# Patient Record
Sex: MALE | Race: WHITE | NOT HISPANIC OR LATINO | Employment: FULL TIME | URBAN - NONMETROPOLITAN AREA
[De-identification: names, ages, dates, MRNs, and addresses within clinical notes are randomized per-mention and may not be internally consistent; named-entity substitution may affect disease eponyms.]

---

## 2017-01-26 ENCOUNTER — OFFICE VISIT (OUTPATIENT)
Dept: FAMILY MEDICINE CLINIC | Facility: CLINIC | Age: 32
End: 2017-01-26

## 2017-01-26 VITALS
HEART RATE: 81 BPM | OXYGEN SATURATION: 97 % | DIASTOLIC BLOOD PRESSURE: 85 MMHG | SYSTOLIC BLOOD PRESSURE: 125 MMHG | WEIGHT: 210.2 LBS | HEIGHT: 70 IN | BODY MASS INDEX: 30.09 KG/M2

## 2017-01-26 DIAGNOSIS — M54.42 CHRONIC BILATERAL LOW BACK PAIN WITH LEFT-SIDED SCIATICA: Primary | ICD-10-CM

## 2017-01-26 DIAGNOSIS — G89.29 CHRONIC BILATERAL LOW BACK PAIN WITH LEFT-SIDED SCIATICA: Primary | ICD-10-CM

## 2017-01-26 DIAGNOSIS — F17.210 CIGARETTE NICOTINE DEPENDENCE WITHOUT COMPLICATION: ICD-10-CM

## 2017-01-26 PROCEDURE — 99213 OFFICE O/P EST LOW 20 MIN: CPT | Performed by: FAMILY MEDICINE

## 2017-01-26 RX ORDER — BUPRENORPHINE HYDROCHLORIDE, NALOXONE HYDROCHLORIDE 8; 2 MG/1; MG/1
FILM, SOLUBLE BUCCAL; SUBLINGUAL
Refills: 0 | COMMUNITY
Start: 2017-01-17

## 2017-01-26 RX ORDER — GABAPENTIN 400 MG/1
800 CAPSULE ORAL 3 TIMES DAILY
Qty: 180 CAPSULE | Refills: 6 | Status: SHIPPED | OUTPATIENT
Start: 2017-01-26

## 2017-01-26 NOTE — MR AVS SNAPSHOT
Albert Hodge   1/26/2017 1:45 PM   Office Visit    Dept Phone:  [phone]   Encounter #:  [id number]    Provider:  Ludwig Walker MD   Department:  [facility]                Your Full Care Plan              Today's Medication Changes          These changes are accurate as of: 1/26/17  2:24 PM.  If you have any questions, ask your nurse or doctor.               Medication(s)that have changed:     * gabapentin 600 MG tablet   Commonly known as:  NEURONTIN   Take 1 tablet by mouth 3 (Three) Times a Day.   What changed:  Another medication with the same name was added. Make sure you understand how and when to take each.   Changed by:  Ludwig Walker MD       * gabapentin 400 MG capsule   Commonly known as:  NEURONTIN   Take 2 capsules by mouth 3 (Three) Times a Day.   What changed:  You were already taking a medication with the same name, and this prescription was added. Make sure you understand how and when to take each.   Changed by:  Ludwig Walker MD       * Notice:  This list has 2 medication(s) that are the same as other medications prescribed for you. Read the directions carefully, and ask your doctor or other care provider to review them with you.         Where to Get Your Medications      These medications were sent to [facility]/pharmacy #[id number] - [place], [place] - [address] [place] [address] [phone]  - [phone] [address] [address]     Phone:  [phone]     gabapentin 400 MG capsule                  Your Updated Medication List          This list is accurate as of: 1/26/17  2:24 PM.  Always use your most recent med list.                buPROPion  MG 12 hr tablet   Commonly known as:  WELLBUTRIN SR   Take 1 tablet by mouth 2 (Two) Times a Day.       doxycycline 50 MG capsule   Commonly known as:  MONODOX   Take 2 capsules by mouth 2 (Two) Times a Day.       * gabapentin 600 MG tablet   Commonly known as:  NEURONTIN   Take  "1 tablet by mouth 3 (Three) Times a Day.       * gabapentin 400 MG capsule   Commonly known as:  NEURONTIN   Take 2 capsules by mouth 3 (Three) Times a Day.       SUBOXONE 8-2 MG film film   Generic drug:  buprenorphine-naloxone       * Notice:  This list has 2 medication(s) that are the same as other medications prescribed for you. Read the directions carefully, and ask your doctor or other care provider to review them with you.            You Were Diagnosed With        Codes Comments    Chronic bilateral low back pain with left-sided sciatica    -  Primary ICD-10-CM: M54.42, G89.29  ICD-9-CM: 724.2, 724.3, 338.29     Cigarette nicotine dependence without complication     ICD-10-CM: F17.210  ICD-9-CM: 305.1       Instructions     None    Patient Instructions History      Upcoming Appointments     Visit Type Date Time Department    OFFICE VISIT 1/26/2017  1:45 PM [facility] RESIDENT [facility]      [facility]     Our records indicate that you have declined [email or web address] signup. If you would like to sign up for [employer], please email [email or web address] or call [phone] to obtain an activation code.             Other Info from Your Visit           Other Notes About Your Plan     Risk score: 3  Red Team        Allergies     No Known Allergies      Reason for Visit     Follow-up     Back Pain           Vital Signs     Blood Pressure Pulse Height Weight Oxygen Saturation Body Mass Index    125/85 (BP Location: Right arm, Patient Position: Sitting) 81 70\" (177.8 cm) 210 lb 3.2 oz (95.3 kg) 97% 30.16 kg/m2    Smoking Status                   Current Every Day Smoker           Problems and Diagnoses Noted     Chronic bilateral low back pain with left-sided sciatica    -  Primary    Nicotine dependence            "

## 2017-01-26 NOTE — PROGRESS NOTES
Subjective:     Albert Hodge is a 31 y.o. male who presents for follow up for lower back pain and nicotine dependence. Patient states that since starting his Wellbutrin he has been able to cut down his smoking from 1 ppd to about 0.5 ppd but has not been taking it recently. Patient has still been suffering from his left lumbar back pain with radiation down his left leg down to his left ankle but it has improved with the increase in gabapentin since his last visit. He is still needing to schedule PT for his back pain. Patient has no other complaints or concerns today.     Preventative:  Over the past 2 weeks, have you felt down, depressed, or hopeless?No   Over the past 2 weeks, have you felt little interest or pleasure in doing things?No  Clinical depression screening refused by patient.No     On osteoporosis therapy?Not Indicated     Past Medical Hx:  No past medical history on file.    Past Surgical Hx:  Past Surgical History   Procedure Laterality Date   • Appendectomy         Health Maintenance:  Health Maintenance   Topic Date Due   • TDAP/TD VACCINES (2 - Td) 11/29/2026   • PNEUMOCOCCAL VACCINE (19-64 MEDIUM RISK)  Addressed   • INFLUENZA VACCINE  Addressed       Current Meds:    Current Outpatient Prescriptions:   •  buPROPion SR (WELLBUTRIN SR) 150 MG 12 hr tablet, Take 1 tablet by mouth 2 (Two) Times a Day., Disp: 60 tablet, Rfl: 2  •  doxycycline (MONODOX) 50 MG capsule, Take 2 capsules by mouth 2 (Two) Times a Day., Disp: 40 capsule, Rfl: 0  •  gabapentin (NEURONTIN) 600 MG tablet, Take 1 tablet by mouth 3 (Three) Times a Day., Disp: 90 tablet, Rfl: 6  •  SUBOXONE 8-2 MG film film, , Disp: , Rfl: 0    Allergies:  Review of patient's allergies indicates no known allergies.    Family Hx:  No family history on file.     Social History:  Social History     Social History   • Marital status: Single     Spouse name: N/A   • Number of children: N/A   • Years of education: N/A     Occupational History   •  "Not on file.     Social History Main Topics   • Smoking status: Current Every Day Smoker   • Smokeless tobacco: Not on file   • Alcohol use No   • Drug use: Not on file   • Sexual activity: Not on file     Other Topics Concern   • Not on file     Social History Narrative       Review of Systems  General:negative for - chills, fatigue, fever, hot flashes, malaise, night sweats, weight gain or weight loss  Psychological: negative for - anxiety, depression, sleep disturbances or suicidal ideation  Ophthalmic: negative for - blurry vision or loss of vision  ENT: negative for - hearing change, nasal congestion or sore throat  Hematological and Lymphatic: negative for - jaundice  Endocrine: negative for - hair pattern changes, skin changes.  Respiratory: no cough, shortness of breath, or wheezing  Cardiovascular: no chest pain, edema or dyspnea on exertion  Gastrointestinal: no  Nausea/vomiting, abdominal pain, change in bowel habits, or black or bloody stools  Genito-Urinary: no trouble voiding, dysuria, or hematuria.  Musculoskeletal: negative for - joint pain or muscle pain. Positive for left sided lumbar pain with radiation down left lower extremity, lumbar pain elicited by laying down, inactivity and prolonged walking.  Neurological: negative for - dizziness, headaches, numbness/tingling or seizures  Dermatological: negative for rash and skin lesion changes      Objective:     Visit Vitals   • /85 (BP Location: Right arm, Patient Position: Sitting)   • Pulse 81   • Ht 70\" (177.8 cm)   • Wt 210 lb 3.2 oz (95.3 kg)   • SpO2 97%   • BMI 30.16 kg/m2           General Appearance:    Alert, cooperative, no distress, appears stated age   Head:    Normocephalic, without obvious abnormality, atraumatic   Eyes:    PERRL, conjunctiva/corneas clear, EOM's intact   Ears:    Normal external ear canals, both ears   Nose:   Nares normal, septum midline, mucosa normal, no drainage     or sinus tenderness   Throat:   Lips, " mucosa, and tongue normal; teeth and gums normal   Neck:   Supple, symmetrical, trachea midline, no adenopathy   Back:     Symmetric, no curvature, ROM normal, no CVA tenderness. Pain elicited with laying down on bed.   Lungs:     Clear to auscultation bilaterally, respirations unlabored   Chest Wall:    No tenderness or deformity    Heart:    Regular rate and rhythm, S1 and S2 normal, no murmur, rub    or gallop   Abdomen:     Soft, non-tender, bowel sounds active all four quadrants,     no masses, no organomegaly   Extremities:   Extremities normal, atraumatic, no cyanosis or edema   Pulses:   2+ and symmetric all extremities   Skin:   Skin color, texture, turgor normal, no rashes or lesions   Neurologic:   CNII-XII grossly intact                Assessment/Plan:     1. Chronic bilateral low back pain with left-sided sciatica    2. Cigarette nicotine dependence without complication         Follow-up:     Return in about 6 months (around 7/26/2017) for Next scheduled follow up.    1. Lower back pain- Gabapentin increased to 800 mg TID    -Referral to physical therapy made, patient to make appointment  2. Nicotine dependence- Placed on a 12 week course of Wellbutrin for smoking cessation, has decreased smoking from 1 PPD to about 0.5 ppd  3. Immunizations- patient refused Flu vaccine, TdaP, and pneumococcal vaccinations today.    GOALS:  Quit smoking, improve back pain.    Preventative:  Smoking cessation counseling was provided.  Does not drink.  BMI WNL    RISK SCORE: 3        This document has been electronically signed by Ludwig Bentley MD on January 26, 2017 2:15 PM

## 2017-03-13 ENCOUNTER — TRANSCRIBE ORDERS (OUTPATIENT)
Dept: PHYSICAL THERAPY | Facility: HOSPITAL | Age: 32
End: 2017-03-13

## 2017-03-13 DIAGNOSIS — M54.40 BACK PAIN OF LUMBAR REGION WITH SCIATICA: Primary | ICD-10-CM

## 2018-09-10 ENCOUNTER — HOSPITAL ENCOUNTER (EMERGENCY)
Facility: HOSPITAL | Age: 33
Discharge: HOME | End: 2018-09-11
Attending: EMERGENCY MEDICINE | Admitting: EMERGENCY MEDICINE

## 2018-09-10 DIAGNOSIS — L02.91 ABSCESS: Primary | ICD-10-CM

## 2018-09-10 PROCEDURE — 99283 EMERGENCY DEPT VISIT LOW MDM: CPT

## 2018-09-10 RX ORDER — IBUPROFEN 800 MG/1
800 TABLET ORAL EVERY 6 HOURS PRN
Qty: 20 TABLET | Refills: 0 | Status: SHIPPED | OUTPATIENT
Start: 2018-09-10

## 2018-09-10 RX ORDER — CEPHALEXIN 500 MG/1
500 CAPSULE ORAL ONCE
Status: COMPLETED | OUTPATIENT
Start: 2018-09-10 | End: 2018-09-11

## 2018-09-10 RX ORDER — LIDOCAINE HYDROCHLORIDE 20 MG/ML
10 INJECTION, SOLUTION INFILTRATION; PERINEURAL ONCE
Status: COMPLETED | OUTPATIENT
Start: 2018-09-10 | End: 2018-09-10

## 2018-09-10 RX ORDER — OXYCODONE HYDROCHLORIDE AND ACETAMINOPHEN 5; 325 MG/1; MG/1
2 TABLET ORAL ONCE
Status: COMPLETED | OUTPATIENT
Start: 2018-09-10 | End: 2018-09-11

## 2018-09-10 RX ORDER — CEPHALEXIN 500 MG/1
500 CAPSULE ORAL 3 TIMES DAILY
Qty: 15 CAPSULE | Refills: 0 | Status: SHIPPED | OUTPATIENT
Start: 2018-09-10 | End: 2018-09-14 | Stop reason: SDUPTHER

## 2018-09-10 RX ADMIN — LIDOCAINE HYDROCHLORIDE 10 ML: 20 INJECTION, SOLUTION INFILTRATION; PERINEURAL at 22:26

## 2018-09-10 NOTE — ED TRIAGE NOTES
Patient provided with warm compress to place on abscess located on the inside of his right arm.

## 2018-09-11 ENCOUNTER — TELEPHONE (OUTPATIENT)
Dept: FAMILY MEDICINE CLINIC | Facility: CLINIC | Age: 33
End: 2018-09-11

## 2018-09-11 VITALS
SYSTOLIC BLOOD PRESSURE: 123 MMHG | TEMPERATURE: 97.6 F | WEIGHT: 200 LBS | RESPIRATION RATE: 18 BRPM | HEIGHT: 70 IN | OXYGEN SATURATION: 96 % | BODY MASS INDEX: 28.63 KG/M2 | DIASTOLIC BLOOD PRESSURE: 71 MMHG | HEART RATE: 62 BPM

## 2018-09-11 RX ADMIN — OXYCODONE HYDROCHLORIDE AND ACETAMINOPHEN 2 TABLET: 5; 325 TABLET ORAL at 00:02

## 2018-09-11 RX ADMIN — CEPHALEXIN 500 MG: 500 CAPSULE ORAL at 00:02

## 2018-09-11 NOTE — ED PROVIDER NOTES
Subjective   32-year-old male presents the emergency department with an abscess to his right elbow.  He started having pain in the area on the medial upper arm just proximal to the elbow several days ago.  He's been placing warm compresses on it and it seems to be worsening.  He said no fevers or chills.  He has had no drainage in that area.  The patient denies any IV drug use.  Nothing makes symptoms better or worse.        Abscess   Location:  Shoulder/arm  Shoulder/arm abscess location:  R elbow  Abscess quality: painful, redness and warmth    Duration:  4 days  Progression:  Worsening  Pain details:     Quality:  Aching    Severity:  Severe    Timing:  Constant    Progression:  Worsening  Chronicity:  New  Context: not injected drug use and not skin injury    Relieved by:  Nothing  Worsened by:  Nothing  Associated symptoms: no fatigue, no fever, no nausea and no vomiting        Review of Systems   Constitutional: Negative for chills, fatigue and fever.   HENT: Negative for congestion and sore throat.    Eyes: Negative for visual disturbance.   Respiratory: Negative for cough and shortness of breath.    Cardiovascular: Negative for chest pain and leg swelling.   Gastrointestinal: Negative for abdominal pain, nausea and vomiting.   Genitourinary: Negative for dysuria.   Musculoskeletal: Negative for back pain.   Skin: Negative for rash.   Neurological: Negative for dizziness and weakness.   Psychiatric/Behavioral: Negative for behavioral problems.   All other systems reviewed and are negative.      History reviewed. No pertinent past medical history.    No Known Allergies    Past Surgical History:   Procedure Laterality Date   • APPENDECTOMY         History reviewed. No pertinent family history.    Social History     Social History   • Marital status: Single     Social History Main Topics   • Smoking status: Current Every Day Smoker     Packs/day: 1.00     Years: 8.00     Types: Cigarettes   • Smokeless tobacco:  Never Used   • Alcohol use No   • Drug use: No   • Sexual activity: Defer     Other Topics Concern   • Not on file           Objective   Physical Exam   Constitutional: He is oriented to person, place, and time. He appears well-developed and well-nourished.   HENT:   Head: Normocephalic and atraumatic.   Eyes: Pupils are equal, round, and reactive to light. EOM are normal.   Neck: Normal range of motion. Neck supple.   No midline tenderness   Cardiovascular: Normal rate, regular rhythm, normal heart sounds and intact distal pulses.  Exam reveals no gallop and no friction rub.    No murmur heard.  Pulmonary/Chest: Breath sounds normal. No respiratory distress. He has no wheezes. He has no rales.   No rhonchi   Abdominal: Soft. Bowel sounds are normal. He exhibits no distension. There is no tenderness.   Musculoskeletal: Normal range of motion. He exhibits no tenderness or deformity.   Neurological: He is alert and oriented to person, place, and time. No cranial nerve deficit. He exhibits normal muscle tone. Coordination normal.   Skin: Skin is warm and dry. No rash noted.   There is a 2 cm tender indurated but not fluctuant nodule on the medial aspect of the right elbow just proximal to the medial epicondyle.    Psychiatric: He has a normal mood and affect. His behavior is normal.   Nursing note and vitals reviewed.      Incision & Drainage  Date/Time: 9/10/2018 11:26 PM  Performed by: GEOVANNY RAMIREZ  Authorized by: GEOVANNY RAMIREZ     Consent:     Consent obtained:  Verbal    Consent given by:  Patient    Risks discussed:  Bleeding, damage to other organs and infection    Alternatives discussed:  No treatment  Location:     Type:  Abscess    Location:  Upper extremity    Upper extremity location:  Elbow    Elbow location:  R elbow  Pre-procedure details:     Skin preparation:  Betadine  Procedure details:     Needle aspiration: no      Incision types:  Stab incision    Incision depth:  Subcutaneous    Scalpel  blade:  11    Wound management:  Probed and deloculated    Drainage:  Bloody    Drainage amount:  Scant    Wound treatment:  Wound left open    Packing materials:  None  Post-procedure details:     Patient tolerance of procedure:  Tolerated well, no immediate complications                 ED Course                  MDM  Number of Diagnoses or Management Options  Abscess:   Diagnosis management comments: 11:37 PM small upper extremity abscess with out abscess cavity at this time.  Patient was given antibiotics here.  Patient also states he is no longer taking Suboxone and would like to have pain medication.        Final diagnoses:   Abscess            Alex Lloyd MD  09/10/18 [id number]

## 2018-09-11 NOTE — ED NOTES
Attempted to take pt to room, pt not in lobby at this time.      Kylah Haywood, RN  09/10/18 1953

## 2018-09-14 ENCOUNTER — HOSPITAL ENCOUNTER (EMERGENCY)
Facility: HOSPITAL | Age: 33
Discharge: HOME | End: 2018-09-14
Attending: FAMILY MEDICINE | Admitting: FAMILY MEDICINE

## 2018-09-14 ENCOUNTER — OFFICE VISIT (OUTPATIENT)
Dept: FAMILY MEDICINE CLINIC | Facility: CLINIC | Age: 33
End: 2018-09-14

## 2018-09-14 VITALS
SYSTOLIC BLOOD PRESSURE: 124 MMHG | HEIGHT: 70 IN | WEIGHT: 198.8 LBS | BODY MASS INDEX: 28.46 KG/M2 | OXYGEN SATURATION: 98 % | TEMPERATURE: 98.8 F | HEART RATE: 80 BPM | DIASTOLIC BLOOD PRESSURE: 78 MMHG

## 2018-09-14 VITALS
HEART RATE: 73 BPM | SYSTOLIC BLOOD PRESSURE: 122 MMHG | WEIGHT: 200 LBS | DIASTOLIC BLOOD PRESSURE: 72 MMHG | OXYGEN SATURATION: 97 % | TEMPERATURE: 97.2 F | RESPIRATION RATE: 18 BRPM | BODY MASS INDEX: 28.63 KG/M2 | HEIGHT: 70 IN

## 2018-09-14 DIAGNOSIS — L02.91 ABSCESS: Primary | ICD-10-CM

## 2018-09-14 DIAGNOSIS — Z71.6 ENCOUNTER FOR TOBACCO USE CESSATION COUNSELING: ICD-10-CM

## 2018-09-14 PROCEDURE — 99283 EMERGENCY DEPT VISIT LOW MDM: CPT

## 2018-09-14 PROCEDURE — 99203 OFFICE O/P NEW LOW 30 MIN: CPT | Performed by: STUDENT IN AN ORGANIZED HEALTH CARE EDUCATION/TRAINING PROGRAM

## 2018-09-14 RX ORDER — CEPHALEXIN 500 MG/1
500 CAPSULE ORAL 4 TIMES DAILY
Qty: 40 CAPSULE | Refills: 0 | Status: SHIPPED | OUTPATIENT
Start: 2018-09-14

## 2018-09-14 RX ORDER — IBUPROFEN 800 MG/1
800 TABLET ORAL EVERY 8 HOURS PRN
Qty: 30 TABLET | Refills: 0 | Status: SHIPPED | OUTPATIENT
Start: 2018-09-14

## 2018-09-14 RX ORDER — CEPHALEXIN 500 MG/1
500 CAPSULE ORAL 3 TIMES DAILY
Qty: 15 CAPSULE | Refills: 0 | Status: SHIPPED | OUTPATIENT
Start: 2018-09-14 | End: 2018-09-14

## 2018-09-14 NOTE — ED PROVIDER NOTES
Subjective   Abscess right arm, pt seen in ED earlier this week in the emergency department with I&D and discharged home on Keflex.  Patient states that his refrigerator had a leak, and his kitchen flooded, and his prescription got wet and ruined.        Abscess   Location:  Shoulder/arm  Shoulder/arm abscess location:  R arm  Abscess quality: induration and painful    Abscess quality: not draining, no fluctuance, no itching, no redness, no warmth and not weeping    Duration:  6 days  Progression:  Improving  Pain details:     Quality:  Aching    Severity:  Mild    Duration:  6 days    Progression:  Waxing and waning  Chronicity:  New  Worsened by:  Nothing  Associated symptoms: no fatigue, no fever, no headaches, no nausea and no vomiting        Review of Systems   Constitutional: Negative for appetite change, chills, diaphoresis, fatigue and fever.   HENT: Negative for congestion, ear discharge, ear pain, nosebleeds, rhinorrhea, sinus pressure, sore throat and trouble swallowing.    Eyes: Negative for discharge and redness.   Respiratory: Negative for apnea, cough, chest tightness, shortness of breath and wheezing.    Cardiovascular: Negative for chest pain.   Gastrointestinal: Negative for abdominal pain, diarrhea, nausea and vomiting.   Endocrine: Negative for polyuria.   Genitourinary: Negative for dysuria, frequency and urgency.   Musculoskeletal: Negative for myalgias and neck pain.   Skin: Negative for color change and rash.   Allergic/Immunologic: Negative for immunocompromised state.   Neurological: Negative for dizziness, seizures, syncope, weakness, light-headedness and headaches.   Hematological: Negative for adenopathy. Does not bruise/bleed easily.   Psychiatric/Behavioral: Negative for behavioral problems and confusion.   All other systems reviewed and are negative.      History reviewed. No pertinent past medical history.    No Known Allergies    Past Surgical History:   Procedure Laterality Date    • APPENDECTOMY         History reviewed. No pertinent family history.    Social History     Social History   • Marital status: Single     Social History Main Topics   • Smoking status: Current Every Day Smoker     Packs/day: 1.00     Years: 8.00     Types: Cigarettes   • Smokeless tobacco: Never Used   • Alcohol use No   • Drug use: No   • Sexual activity: Defer     Other Topics Concern   • Not on file           Objective   Physical Exam   Constitutional: He is oriented to person, place, and time. He appears well-developed and well-nourished.   HENT:   Head: Normocephalic and atraumatic.   Nose: Nose normal.   Mouth/Throat: Oropharynx is clear and moist.   Eyes: Pupils are equal, round, and reactive to light. Conjunctivae and EOM are normal. Right eye exhibits no discharge. Left eye exhibits no discharge. No scleral icterus.   Neck: Normal range of motion. Neck supple. No tracheal deviation present.   Cardiovascular: Normal rate, regular rhythm and normal heart sounds.    No murmur heard.  Pulmonary/Chest: Effort normal and breath sounds normal. No stridor. No respiratory distress. He has no wheezes. He has no rales.   Abdominal: Soft. Bowel sounds are normal. He exhibits no distension and no mass. There is no tenderness. There is no rebound and no guarding.   Musculoskeletal: He exhibits no edema.        Right shoulder: He exhibits tenderness.        Arms:  2 cm area of painful induration over the I & D site.  No erythema, no purulence, no drainage.   Neurological: He is alert and oriented to person, place, and time. Coordination normal.   Skin: Skin is warm and dry. No rash noted. No erythema.   Psychiatric: He has a normal mood and affect. His behavior is normal. Thought content normal.   Nursing note and vitals reviewed.      Procedures           ED Course                  MDM      Final diagnoses:   Abscess            Jermaine Dueñas MD  09/14/18 1219

## 2018-09-14 NOTE — PROGRESS NOTES
ID: Albert Hodge    CC:   Chief Complaint   Patient presents with   • Abscess     er follow up       Subjective:     Albert Hodge is a 32 y.o. male who presents for arm pain.    3 days ago patient went to the ER and had an IND of an abscess on his right forearm near the elbow.  He was given a prescription of antibiotics to go home on, but never got it filled.  He also says that they packed it open, but he never has changed the dressing.  Patient has had subjective fevers and chills at home.  He has been nauseated, and not really eating anything.  Patient has excruciating right arm pain and is barely able to move his right upper extremity due to pain.         Preventative:  Over the past 2 weeks, have you felt down, depressed, or hopeless?No   Over the past 2 weeks, have you felt little interest or pleasure in doing things?No  Clinical depression screening refused by patient.No     On osteoporosis therapy?No     Past Medical Hx:  History reviewed. No pertinent past medical history.    Past Surgical Hx:  Past Surgical History:   Procedure Laterality Date   • APPENDECTOMY         Health Maintenance:  Health Maintenance   Topic Date Due   • ANNUAL PHYSICAL  12/30/1987   • INFLUENZA VACCINE  08/01/2018   • TDAP/TD VACCINES (2 - Td) 11/29/2026   • PNEUMOCOCCAL VACCINE (19-64 MEDIUM RISK)  Addressed       Current Meds:    Current Outpatient Prescriptions:   •  buPROPion SR (WELLBUTRIN SR) 150 MG 12 hr tablet, Take 1 tablet by mouth 2 (Two) Times a Day., Disp: 60 tablet, Rfl: 2  •  cephalexin (KEFLEX) 500 MG capsule, Take 1 capsule by mouth 3 (Three) Times a Day., Disp: 15 capsule, Rfl: 0  •  doxycycline (MONODOX) 50 MG capsule, Take 2 capsules by mouth 2 (Two) Times a Day., Disp: 40 capsule, Rfl: 0  •  gabapentin (NEURONTIN) 400 MG capsule, Take 2 capsules by mouth 3 (Three) Times a Day., Disp: 180 capsule, Rfl: 6  •  gabapentin (NEURONTIN) 600 MG tablet, Take 1 tablet by mouth 3 (Three) Times a Day.,  Disp: 90 tablet, Rfl: 6  •  ibuprofen (ADVIL,MOTRIN) 800 MG tablet, Take 1 tablet by mouth Every 6 (Six) Hours As Needed for Mild Pain ., Disp: 20 tablet, Rfl: 0  •  SUBOXONE 8-2 MG film film, , Disp: , Rfl: 0    Allergies:  Patient has no known allergies.    Family Hx:  History reviewed. No pertinent family history.     Social History:  Social History     Social History   • Marital status: Single     Spouse name: N/A   • Number of children: N/A   • Years of education: N/A     Occupational History   • Not on file.     Social History Main Topics   • Smoking status: Current Every Day Smoker     Packs/day: 1.00     Years: 8.00     Types: Cigarettes   • Smokeless tobacco: Never Used   • Alcohol use No   • Drug use: No   • Sexual activity: Defer     Other Topics Concern   • Not on file     Social History Narrative   • No narrative on file       Review of Systems   Constitutional: Positive for appetite change, chills and fever. Negative for activity change, diaphoresis and fatigue.   HENT: Negative for congestion, ear pain, rhinorrhea, sneezing and sore throat.    Eyes: Negative for pain, redness, itching and visual disturbance.   Respiratory: Negative for cough, choking, chest tightness, shortness of breath, wheezing and stridor.    Cardiovascular: Negative for chest pain, palpitations and leg swelling.   Gastrointestinal: Positive for nausea. Negative for abdominal distention, abdominal pain, blood in stool, constipation, diarrhea, rectal pain and vomiting.   Genitourinary: Negative for difficulty urinating, dysuria, flank pain and frequency.   Musculoskeletal:        Arm pain   Skin: Positive for wound. Negative for color change and rash.   Neurological: Negative for dizziness, seizures, syncope, weakness, light-headedness, numbness and headaches.   Psychiatric/Behavioral: Negative for agitation, confusion, decreased concentration and sleep disturbance. The patient is not nervous/anxious.    All other systems reviewed  "and are negative.          Objective:     /78 (BP Location: Left arm, Patient Position: Sitting, Cuff Size: Large Adult)   Pulse 80   Temp 98.8 °F (37.1 °C) (Tympanic)   Ht 177.8 cm (70\")   Wt 90.2 kg (198 lb 12.8 oz)   SpO2 98%   BMI 28.52 kg/m²     Physical Exam   Constitutional: He is oriented to person, place, and time. He appears well-developed and well-nourished. He is active.  Non-toxic appearance. He does not have a sickly appearance. He appears ill. He appears distressed.   HENT:   Head: Normocephalic.   Right Ear: External ear normal. No lacerations. No swelling or tenderness.   Left Ear: External ear normal. No lacerations. No swelling or tenderness.   Nose: Nose normal.   Mouth/Throat: Uvula is midline, oropharynx is clear and moist and mucous membranes are normal.   Eyes: Pupils are equal, round, and reactive to light. Conjunctivae, EOM and lids are normal.   Cardiovascular: Normal heart sounds and intact distal pulses.    Pulmonary/Chest: Effort normal and breath sounds normal. No accessory muscle usage. No respiratory distress. He has no decreased breath sounds. He has no wheezes. He has no rhonchi. He has no rales. He exhibits no tenderness.   Abdominal: Soft. Bowel sounds are normal. There is no tenderness. There is no rigidity, no rebound and no guarding.   Musculoskeletal:        Right lower leg: He exhibits no swelling.        Left lower leg: He exhibits no swelling.        Right foot: There is no swelling.        Left foot: There is no swelling.   Decreased right upper extremity range of motion due to pain from his abscess.     Vascular Status -  His right foot exhibits no edema. His left foot exhibits no edema.  Lymphadenopathy:     He has no cervical adenopathy.   Neurological: He is alert and oriented to person, place, and time. He has normal strength. He is not disoriented. No cranial nerve deficit (grossly intact). He exhibits normal muscle tone. GCS eye subscore is 4. GCS " verbal subscore is 5. GCS motor subscore is 6.   Skin: Skin is warm and dry. Capillary refill takes less than 2 seconds. Lesion and rash noted. Rash is nodular and vesicular. He is not diaphoretic.        Nursing note and vitals reviewed.             Assessment/Plan:   Albert Hodge is a 32 y.o. male who was seen in clinic for:     Diagnosis Plan   1. Abscess  cephalexin (KEFLEX) 500 MG capsule  Pt going to ER via private vehicle    Due to pain, possibility of retained packing, abscess over a vein, Dr. Cash and I determined it would be prudent for the patient to be reevaluated in the ER.   2. Encounter for tobacco use cessation counseling           Follow-up:     Return in about 1 week (around 9/21/2018) for Recheck abscess.      Goals: improve arm pain  Barriers to goals: pt compliance    Health Maintenance   Topic Date Due   • ANNUAL PHYSICAL  12/30/1987   • INFLUENZA VACCINE  08/01/2018   • TDAP/TD VACCINES (2 - Td) 11/29/2026   • PNEUMOCOCCAL VACCINE (19-64 MEDIUM RISK)  Addressed   --defer to PCP--    Tobacco: Smoking cessation counseling was provided.  Alcohol: occasional/rare  Lifestyle: Body mass index is 28.52 kg/m². eat more fruits and vegetables, decrease soda or juice intake, increase water intake, increase physical activity, reduce screen time, reduce portion size, cut out extra servings, reduce fast food intake, family to eat at dinner table more often, keep TV off during meals, plan meals, eat breakfast and have 3 meals a day    RISK SCORE: 3        This document has been electronically signed by Agus Espinosa MD on September 14, 2018 11:20 AM

## 2018-09-14 NOTE — DISCHARGE INSTRUCTIONS
Skin Abscess  A skin abscess is an infected area on or under your skin that contains pus and other material. An abscess can happen almost anywhere on your body. Some abscesses break open (rupture) on their own. Most continue to get worse unless they are treated. The infection can spread deeper into the body and into your blood, which can make you feel sick. Treatment usually involves draining the abscess.  Follow these instructions at home:  Abscess Care  · If you have an abscess that has not drained, place a warm, clean, wet washcloth over the abscess several times a day. Do this as told by your doctor.  · Follow instructions from your doctor about how to take care of your abscess. Make sure you:  ? Cover the abscess with a bandage (dressing).  ? Change your bandage or gauze as told by your doctor.  ? Wash your hands with soap and water before you change the bandage or gauze. If you cannot use soap and water, use hand .  · Check your abscess every day for signs that the infection is getting worse. Check for:  ? More redness, swelling, or pain.  ? More fluid or blood.  ? Warmth.  ? More pus or a bad smell.  Medicines    · Take over-the-counter and prescription medicines only as told by your doctor.  · If you were prescribed an antibiotic medicine, take it as told by your doctor. Do not stop taking the antibiotic even if you start to feel better.  General instructions  · To avoid spreading the infection:  ? Do not share personal care items, towels, or hot tubs with others.  ? Avoid making skin-to-skin contact with other people.  · Keep all follow-up visits as told by your doctor. This is important.  Contact a doctor if:  · You have more redness, swelling, or pain around your abscess.  · You have more fluid or blood coming from your abscess.  · Your abscess feels warm when you touch it.  · You have more pus or a bad smell coming from your abscess.  · You have a fever.  · Your muscles ache.  · You have  chills.  · You feel sick.  Get help right away if:  · You have very bad (severe) pain.  · You see red streaks on your skin spreading away from the abscess.  This information is not intended to replace advice given to you by your health care provider. Make sure you discuss any questions you have with your health care provider.  Document Released: 06/05/2009 Document Revised: 08/13/2017 Document Reviewed: 10/26/2016  [employer] Patient Education © 2018 [employer]

## 2018-09-16 NOTE — PROGRESS NOTES
I have seen this patient and discussed the case with resident and agree with the assessment and plan.  LYNDA Cash M.D.

## 2018-09-17 ENCOUNTER — OFFICE VISIT (OUTPATIENT)
Dept: FAMILY MEDICINE CLINIC | Facility: CLINIC | Age: 33
End: 2018-09-17

## 2018-09-17 VITALS
HEART RATE: 85 BPM | WEIGHT: 201.13 LBS | TEMPERATURE: 98.5 F | OXYGEN SATURATION: 97 % | SYSTOLIC BLOOD PRESSURE: 128 MMHG | BODY MASS INDEX: 28.79 KG/M2 | HEIGHT: 70 IN | DIASTOLIC BLOOD PRESSURE: 70 MMHG

## 2018-09-17 DIAGNOSIS — L02.413 ABSCESS OF ARM, RIGHT: Primary | ICD-10-CM

## 2018-09-17 PROCEDURE — 99213 OFFICE O/P EST LOW 20 MIN: CPT | Performed by: STUDENT IN AN ORGANIZED HEALTH CARE EDUCATION/TRAINING PROGRAM

## 2018-09-25 NOTE — PROGRESS NOTES
I have reviewed the patient.  I have reviewed the notes, assessments, and/or procedures performed by Dr Grullon, I concur with her/his documentation and assessment and plan for Albert Hodge.          This document has been electronically signed by Elmer Alvarez MD on September 25, 2018 10:53 AM

## 2018-09-25 NOTE — PROGRESS NOTES
Subjective:     Albert Hodge is a 32 y.o. male who presents for ER follow-up for arm abscess.    Patient was seen in the ER on Monday for a follow-up on an arm abscess.  He was working with a  when day and the following day noticed that he had a small mass on his right arm in the crease of his elbow.  The mass continued to get bigger and became red and painful.  It did not drain at home, however he was seen for an I&D prior to this visit.  The abscess was drained and packed and is subsequently healing.  He continues to have pain and decreased range of motion in his right arm and is requesting FMLA papers be filled out so that he came get reimbursed for his weeks off.  He works in maintenance at [employer] and has completed a course of antibiotics.  He does not feel that he can return to work at this time as he still has decreased range of motion.    Preventative:  Over the past 2 weeks, have you felt down, depressed, or hopeless?No   Over the past 2 weeks, have you felt little interest or pleasure in doing things?No  Clinical depression screening refused by patient.No     On osteoporosis therapy?No     Past Medical Hx:  No past medical history on file.    Past Surgical Hx:  Past Surgical History:   Procedure Laterality Date   • APPENDECTOMY         Health Maintenance:  Health Maintenance   Topic Date Due   • ANNUAL PHYSICAL  12/30/1987   • INFLUENZA VACCINE  08/01/2018   • TDAP/TD VACCINES (2 - Td) 11/29/2026   • PNEUMOCOCCAL VACCINE (19-64 MEDIUM RISK)  Addressed       Current Meds:    Current Outpatient Prescriptions:   •  cephalexin (KEFLEX) 500 MG capsule, Take 1 capsule by mouth 4 (Four) Times a Day., Disp: 40 capsule, Rfl: 0  •  ibuprofen (ADVIL,MOTRIN) 800 MG tablet, Take 1 tablet by mouth Every 8 (Eight) Hours As Needed for Mild Pain ., Disp: 30 tablet, Rfl: 0  •  buPROPion SR (WELLBUTRIN SR) 150 MG 12 hr tablet, Take 1 tablet by mouth 2 (Two) Times a Day., Disp: 60 tablet, Rfl: 2  •   gabapentin (NEURONTIN) 400 MG capsule, Take 2 capsules by mouth 3 (Three) Times a Day., Disp: 180 capsule, Rfl: 6  •  gabapentin (NEURONTIN) 600 MG tablet, Take 1 tablet by mouth 3 (Three) Times a Day., Disp: 90 tablet, Rfl: 6  •  ibuprofen (ADVIL,MOTRIN) 800 MG tablet, Take 1 tablet by mouth Every 6 (Six) Hours As Needed for Mild Pain ., Disp: 20 tablet, Rfl: 0  •  SUBOXONE 8-2 MG film film, , Disp: , Rfl: 0    Allergies:  Patient has no known allergies.    Family Hx:  No family history on file.     Social History:  Social History     Social History   • Marital status: Single     Spouse name: N/A   • Number of children: N/A   • Years of education: N/A     Occupational History   • Not on file.     Social History Main Topics   • Smoking status: Current Every Day Smoker     Packs/day: 1.00     Years: 8.00     Types: Cigarettes   • Smokeless tobacco: Never Used   • Alcohol use No   • Drug use: No   • Sexual activity: Defer     Other Topics Concern   • Not on file     Social History Narrative   • No narrative on file       Review of Systems   Constitutional: Negative for activity change, appetite change, chills, fatigue and fever.   HENT: Negative for hearing loss, sneezing, sore throat and trouble swallowing.    Eyes: Negative for visual disturbance.   Respiratory: Negative for cough, chest tightness and shortness of breath.    Cardiovascular: Negative for chest pain, palpitations and leg swelling.   Gastrointestinal: Negative for abdominal pain, blood in stool, constipation, diarrhea, nausea and vomiting.   Genitourinary: Negative for difficulty urinating, dysuria and frequency.   Musculoskeletal: Negative for arthralgias and back pain.   Skin: Positive for wound. Negative for rash.   Allergic/Immunologic: Negative for environmental allergies and food allergies.   Neurological: Negative for dizziness, light-headedness and headaches.   Psychiatric/Behavioral: Negative for agitation, confusion, hallucinations and  "suicidal ideas.           Objective:     /70 (BP Location: Left arm, Patient Position: Sitting, Cuff Size: Large Adult)   Pulse 85   Temp 98.5 °F (36.9 °C) (Tympanic)   Ht 177.8 cm (70\")   Wt 91.2 kg (201 lb 2 oz)   SpO2 97%   BMI 28.86 kg/m²     Physical Exam   Constitutional: He is oriented to person, place, and time. He appears well-developed and well-nourished. No distress.   HENT:   Head: Normocephalic and atraumatic.   Right Ear: External ear normal.   Left Ear: External ear normal.   Eyes: Pupils are equal, round, and reactive to light. No scleral icterus.   Neck: Normal range of motion. Neck supple. No thyromegaly present.   Cardiovascular: Normal rate, regular rhythm and normal heart sounds.    Pulmonary/Chest: Effort normal and breath sounds normal. No respiratory distress.   Abdominal: Soft. Bowel sounds are normal. He exhibits no distension and no mass. There is no tenderness.   Musculoskeletal: He exhibits no edema.   Decreased ROM in right arm. Well healing incision overlying area of induration on right forearm next to crease of elbow.   Lymphadenopathy:     He has no cervical adenopathy.   Neurological: He is alert and oriented to person, place, and time. He has normal reflexes.   Skin: Skin is warm and dry.   Psychiatric: He has a normal mood and affect. His behavior is normal.         Assessment/Plan:     Albert was seen today for arm infection.    Diagnoses and all orders for this visit:    Abscess of arm, right          Follow-up:     Return in about 3 months (around 12/17/2018).      Health Maintenance   Topic Date Due   • ANNUAL PHYSICAL  12/30/1987   • INFLUENZA VACCINE  08/01/2018   • TDAP/TD VACCINES (2 - Td) 11/29/2026   • PNEUMOCOCCAL VACCINE (19-64 MEDIUM RISK)  Addressed       Goal: improved range of motion in arm, return to work    Preventative:    Vaccines Recommended at this visit:   No Vaccines recommended today. Patient is up to date on all vaccines.     Vaccines " Received at this visit:  No Vaccines recommended today. Patient is up to date on all vaccines.     Screenings Recommended at this visit:  No Screenings offered today. Patient is up to date on all screenings at this time.     Screenings Ordered at this visit:  No screenings were offered today. Patient is up to date on all screenings.     Smoking Status:  Patient is current smoker. Patient is not interested in smoking cessation.    Alcohol Intake:  Patient does not drink    Patient's Body mass index is 28.86 kg/m². BMI is above normal parameters. Recommendations include: exercise counseling and nutrition counseling.         RISK SCORE: 3      This document has been electronically signed by Jody Grullon MD on September 25, 2018 9:20 AM

## 2018-10-22 ENCOUNTER — TELEPHONE (OUTPATIENT)
Dept: FAMILY MEDICINE CLINIC | Facility: CLINIC | Age: 33
End: 2018-10-22

## 2018-10-22 NOTE — TELEPHONE ENCOUNTER
Patient was calling about [facility] papers that he had asked about when he was seen in august.  Says he thought it was in the works and got let go from his job because they had no received any paperwork.      You can contact the patient at [phone]

## 2018-10-22 NOTE — TELEPHONE ENCOUNTER
We attempted to contact patient multiple times in regard to picking up his FMLA paperwork. He did not answer the phone. FMLA papers were subsequently sent to medical records to be scanned into his chart. He is now calling saying that paperwork was never faxed to his office and he has lost his job because of it. On call back, patient stated that he spoke with his HR department who said that another FMLA form could be filled out. Discussed with patient that he would be responsible for picking up the papers once filled out.

## 2018-10-23 ENCOUNTER — TELEPHONE (OUTPATIENT)
Dept: FAMILY MEDICINE CLINIC | Facility: CLINIC | Age: 33
End: 2018-10-23

## 2018-10-23 NOTE — TELEPHONE ENCOUNTER
Pt called regarding [employer] paperwork he dropped off yesterday. He asked to be called as soon as it's complete.    Thanks  Elba